# Patient Record
Sex: FEMALE | Race: WHITE | NOT HISPANIC OR LATINO | Employment: OTHER | URBAN - METROPOLITAN AREA
[De-identification: names, ages, dates, MRNs, and addresses within clinical notes are randomized per-mention and may not be internally consistent; named-entity substitution may affect disease eponyms.]

---

## 2021-12-30 ENCOUNTER — HOSPITAL ENCOUNTER (EMERGENCY)
Facility: HOSPITAL | Age: 76
Discharge: HOME | End: 2021-12-30
Attending: EMERGENCY MEDICINE | Admitting: EMERGENCY MEDICINE

## 2021-12-30 ENCOUNTER — OFFICE VISIT (OUTPATIENT)
Dept: CARDIOLOGY | Facility: HOSPITAL | Age: 76
End: 2021-12-30

## 2021-12-30 ENCOUNTER — APPOINTMENT (OUTPATIENT)
Dept: GENERAL RADIOLOGY | Facility: HOSPITAL | Age: 76
End: 2021-12-30

## 2021-12-30 VITALS
WEIGHT: 120 LBS | OXYGEN SATURATION: 97 % | RESPIRATION RATE: 12 BRPM | BODY MASS INDEX: 21.26 KG/M2 | SYSTOLIC BLOOD PRESSURE: 138 MMHG | HEIGHT: 63 IN | DIASTOLIC BLOOD PRESSURE: 54 MMHG | HEART RATE: 47 BPM | TEMPERATURE: 97.5 F

## 2021-12-30 VITALS
DIASTOLIC BLOOD PRESSURE: 67 MMHG | BODY MASS INDEX: 22.5 KG/M2 | RESPIRATION RATE: 15 BRPM | HEIGHT: 63 IN | SYSTOLIC BLOOD PRESSURE: 150 MMHG | TEMPERATURE: 97.6 F | HEART RATE: 48 BPM | OXYGEN SATURATION: 98 % | WEIGHT: 127 LBS

## 2021-12-30 DIAGNOSIS — I10 HYPERTENSION, UNSPECIFIED TYPE: Primary | ICD-10-CM

## 2021-12-30 DIAGNOSIS — R07.9 CHEST PAIN, UNSPECIFIED TYPE: ICD-10-CM

## 2021-12-30 DIAGNOSIS — G47.10 UNCONTROLLED HYPERSOMNIA: Primary | ICD-10-CM

## 2021-12-30 DIAGNOSIS — R00.1 BRADYCARDIA, SINUS: ICD-10-CM

## 2021-12-30 DIAGNOSIS — G47.33 OSA (OBSTRUCTIVE SLEEP APNEA): ICD-10-CM

## 2021-12-30 LAB
ALBUMIN SERPL-MCNC: 4.3 G/DL (ref 3.5–5.2)
ALBUMIN/GLOB SERPL: 1.5 G/DL
ALP SERPL-CCNC: 67 U/L (ref 39–117)
ALT SERPL W P-5'-P-CCNC: 12 U/L (ref 1–33)
ANION GAP SERPL CALCULATED.3IONS-SCNC: 15 MMOL/L (ref 5–15)
AST SERPL-CCNC: 21 U/L (ref 1–32)
BASOPHILS # BLD AUTO: 0.06 10*3/MM3 (ref 0–0.2)
BASOPHILS NFR BLD AUTO: 0.9 % (ref 0–1.5)
BILIRUB SERPL-MCNC: 0.3 MG/DL (ref 0–1.2)
BUN SERPL-MCNC: 23 MG/DL (ref 8–23)
BUN/CREAT SERPL: 22.5 (ref 7–25)
CALCIUM SPEC-SCNC: 10.3 MG/DL (ref 8.6–10.5)
CHLORIDE SERPL-SCNC: 99 MMOL/L (ref 98–107)
CO2 SERPL-SCNC: 23 MMOL/L (ref 22–29)
CREAT SERPL-MCNC: 1.02 MG/DL (ref 0.57–1)
DEPRECATED RDW RBC AUTO: 44.3 FL (ref 37–54)
EOSINOPHIL # BLD AUTO: 0.3 10*3/MM3 (ref 0–0.4)
EOSINOPHIL NFR BLD AUTO: 4.4 % (ref 0.3–6.2)
ERYTHROCYTE [DISTWIDTH] IN BLOOD BY AUTOMATED COUNT: 13.7 % (ref 12.3–15.4)
GFR SERPL CREATININE-BSD FRML MDRD: 53 ML/MIN/1.73
GLOBULIN UR ELPH-MCNC: 2.8 GM/DL
GLUCOSE SERPL-MCNC: 90 MG/DL (ref 65–99)
HCT VFR BLD AUTO: 40.5 % (ref 34–46.6)
HGB BLD-MCNC: 13.7 G/DL (ref 12–15.9)
HOLD SPECIMEN: NORMAL
IMM GRANULOCYTES # BLD AUTO: 0.03 10*3/MM3 (ref 0–0.05)
IMM GRANULOCYTES NFR BLD AUTO: 0.4 % (ref 0–0.5)
LYMPHOCYTES # BLD AUTO: 2.45 10*3/MM3 (ref 0.7–3.1)
LYMPHOCYTES NFR BLD AUTO: 35.9 % (ref 19.6–45.3)
MCH RBC QN AUTO: 29.7 PG (ref 26.6–33)
MCHC RBC AUTO-ENTMCNC: 33.8 G/DL (ref 31.5–35.7)
MCV RBC AUTO: 87.7 FL (ref 79–97)
MONOCYTES # BLD AUTO: 0.72 10*3/MM3 (ref 0.1–0.9)
MONOCYTES NFR BLD AUTO: 10.6 % (ref 5–12)
NEUTROPHILS NFR BLD AUTO: 3.26 10*3/MM3 (ref 1.7–7)
NEUTROPHILS NFR BLD AUTO: 47.8 % (ref 42.7–76)
NRBC BLD AUTO-RTO: 0 /100 WBC (ref 0–0.2)
NT-PROBNP SERPL-MCNC: 176.6 PG/ML (ref 0–1800)
PLATELET # BLD AUTO: 236 10*3/MM3 (ref 140–450)
PMV BLD AUTO: 10.3 FL (ref 6–12)
POTASSIUM SERPL-SCNC: 5 MMOL/L (ref 3.5–5.2)
PROT SERPL-MCNC: 7.1 G/DL (ref 6–8.5)
QT INTERVAL: 462 MS
QTC INTERVAL: 417 MS
RBC # BLD AUTO: 4.62 10*6/MM3 (ref 3.77–5.28)
SODIUM SERPL-SCNC: 137 MMOL/L (ref 136–145)
TROPONIN T SERPL-MCNC: 0.02 NG/ML (ref 0–0.03)
TROPONIN T SERPL-MCNC: <0.01 NG/ML (ref 0–0.03)
TROPONIN T SERPL-MCNC: <0.01 NG/ML (ref 0–0.03)
WBC NRBC COR # BLD: 6.82 10*3/MM3 (ref 3.4–10.8)
WHOLE BLOOD HOLD SPECIMEN: NORMAL
WHOLE BLOOD HOLD SPECIMEN: NORMAL

## 2021-12-30 PROCEDURE — 99283 EMERGENCY DEPT VISIT LOW MDM: CPT

## 2021-12-30 PROCEDURE — 83880 ASSAY OF NATRIURETIC PEPTIDE: CPT | Performed by: EMERGENCY MEDICINE

## 2021-12-30 PROCEDURE — 36415 COLL VENOUS BLD VENIPUNCTURE: CPT

## 2021-12-30 PROCEDURE — 71045 X-RAY EXAM CHEST 1 VIEW: CPT

## 2021-12-30 PROCEDURE — 84484 ASSAY OF TROPONIN QUANT: CPT | Performed by: EMERGENCY MEDICINE

## 2021-12-30 PROCEDURE — 85025 COMPLETE CBC W/AUTO DIFF WBC: CPT | Performed by: EMERGENCY MEDICINE

## 2021-12-30 PROCEDURE — 99204 OFFICE O/P NEW MOD 45 MIN: CPT | Performed by: NURSE PRACTITIONER

## 2021-12-30 PROCEDURE — 93005 ELECTROCARDIOGRAM TRACING: CPT | Performed by: EMERGENCY MEDICINE

## 2021-12-30 PROCEDURE — 80053 COMPREHEN METABOLIC PANEL: CPT | Performed by: EMERGENCY MEDICINE

## 2021-12-30 RX ORDER — ASPIRIN 81 MG/1
81 TABLET ORAL DAILY
COMMUNITY

## 2021-12-30 RX ORDER — CONJUGATED ESTROGENS 0.62 MG/G
0.62 CREAM VAGINAL AS NEEDED
COMMUNITY

## 2021-12-30 RX ORDER — CLONIDINE HYDROCHLORIDE 0.1 MG/1
0.1 TABLET ORAL 2 TIMES DAILY
COMMUNITY

## 2021-12-30 RX ORDER — LEVOTHYROXINE SODIUM 88 UG/1
88 CAPSULE ORAL DAILY
COMMUNITY

## 2021-12-30 RX ORDER — TELMISARTAN 40 MG/1
40 TABLET ORAL DAILY
Qty: 30 TABLET | Refills: 1 | Status: SHIPPED | OUTPATIENT
Start: 2021-12-30

## 2021-12-30 RX ORDER — TELMISARTAN 20 MG/1
20 TABLET ORAL DAILY
COMMUNITY
End: 2021-12-30 | Stop reason: SDUPTHER

## 2021-12-30 RX ORDER — ROSUVASTATIN CALCIUM 20 MG/1
20 TABLET, COATED ORAL 3 TIMES WEEKLY
COMMUNITY

## 2021-12-30 RX ORDER — SODIUM CHLORIDE 0.9 % (FLUSH) 0.9 %
10 SYRINGE (ML) INJECTION AS NEEDED
Status: DISCONTINUED | OUTPATIENT
Start: 2021-12-30 | End: 2021-12-30 | Stop reason: HOSPADM

## 2021-12-30 RX ORDER — ESTROGENS, CONJUGATED 0.45 MG/1
0.45 TABLET, FILM COATED ORAL DAILY
COMMUNITY
Start: 2021-10-10

## 2021-12-30 NOTE — PROGRESS NOTES
"[facility], [facility]    Chief Complaint  Hypertension (f/u Ed visit)    Subjective    History of Present Illness {CC  Problem List  Visit  Diagnosis   Encounters  Notes  Medications  Labs  Result Review Imaging  Media :23}     Nadira Polo presents to [facility] CARDIOLOGY for   History of Present Illness     76-year-old female presented to the ED earlier today with elevated heart rate. Patient had reported mild chest pressure associated with elevated heart rate. Patient has a history of hypertension poorly controlled. She was trying to wean herself off of some of her hypertensive medications. Patient uses clonidine as needed. Patient is from [facility] and has a cardiologist. She is here visiting for a couple weeks with her family.    Patient with history of MICHAEL on CPAP, bradycardia.  Patient reports home blood pressure log typically 140s to 150s.  But can be as high as 200s.    Denies exertional chest pain, pressure, dizziness, near syncope, syncope, fatigue, dyspnea, edema.  Very active.    Patient reports recent cardiac testing.    Objective     Vital Signs:   Vitals:    12/30/21 1613 12/30/21 1616 12/30/21 1617   BP: 165/72 163/67 150/67   BP Location: Right arm Left arm Left arm   Patient Position: Sitting Standing Sitting   Cuff Size: Adult Small Adult Small Adult   Pulse: (!) 44 57 (!) 48   Resp:   15   Temp:   97.6 °F (36.4 °C)   TempSrc:   Temporal   SpO2: 98% 95% 98%   Weight:   57.6 kg (127 lb)   Height:   160 cm (63\")     Body mass index is 22.5 kg/m².  Physical Exam  Vitals reviewed.   Constitutional:       General: She is not in acute distress.     Appearance: Normal appearance.   Cardiovascular:      Rate and Rhythm: Normal rate and regular rhythm.      Pulses:           Radial pulses are 2+ on the right side.        Dorsalis pedis pulses are 2+ on the right side.        Posterior tibial pulses are 2+ on the right " side.      Heart sounds: Normal heart sounds.   Pulmonary:      Effort: Pulmonary effort is normal.      Breath sounds: Normal breath sounds.   Abdominal:      Palpations: Abdomen is soft.      Tenderness: There is no abdominal tenderness.   Musculoskeletal:      Right lower leg: No edema.      Left lower leg: No edema.   Skin:     General: Skin is warm and dry.   Neurological:      Mental Status: She is alert.   Psychiatric:         Mood and Affect: Mood normal.         Behavior: Behavior is cooperative.              Result Review  Data Reviewed:{ Labs  Result Review  Imaging  Med Tab  Media :23}     Admission on 12/30/2021, Discharged on 12/30/2021   Component Date Value Ref Range Status   • QT Interval 12/30/2021 462  ms Final   • QTC Interval 12/30/2021 417  ms Final   • Troponin T 12/30/2021 <0.010  0.000 - 0.030 ng/mL Final   • Extra Tube 12/30/2021 Hold for add-ons.   Final    Auto resulted.   • Extra Tube 12/30/2021 hold for add-on   Final    Auto resulted   • Extra Tube 12/30/2021 Hold for add-ons.   Final    Auto resulted.   • Extra Tube 12/30/2021 Hold for add-ons.   Final    Auto resulted.   • Extra Tube 12/30/2021 hold for add-on   Final    Auto resulted   • Glucose 12/30/2021 90  65 - 99 mg/dL Final   • BUN 12/30/2021 23  8 - 23 mg/dL Final   • Creatinine 12/30/2021 1.02* 0.57 - 1.00 mg/dL Final   • Sodium 12/30/2021 137  136 - 145 mmol/L Final   • Potassium 12/30/2021 5.0  3.5 - 5.2 mmol/L Final    Slight hemolysis detected by analyzer. Results may be affected.   • Chloride 12/30/2021 99  98 - 107 mmol/L Final   • CO2 12/30/2021 23.0  22.0 - 29.0 mmol/L Final   • Calcium 12/30/2021 10.3  8.6 - 10.5 mg/dL Final   • Total Protein 12/30/2021 7.1  6.0 - 8.5 g/dL Final   • Albumin 12/30/2021 4.30  3.50 - 5.20 g/dL Final   • ALT (SGPT) 12/30/2021 12  1 - 33 U/L Final   • AST (SGOT) 12/30/2021 21  1 - 32 U/L Final   • Alkaline Phosphatase 12/30/2021 67  39 - 117 U/L Final   • Total Bilirubin 12/30/2021  0.3  0.0 - 1.2 mg/dL Final   • eGFR Non African Amer 12/30/2021 53* >60 mL/min/1.73 Final   • Globulin 12/30/2021 2.8  gm/dL Final    Calculated Result   • A/G Ratio 12/30/2021 1.5  g/dL Final   • BUN/Creatinine Ratio 12/30/2021 22.5  7.0 - 25.0 Final   • Anion Gap 12/30/2021 15.0  5.0 - 15.0 mmol/L Final   • proBNP 12/30/2021 176.6  0.0-1,800.0 pg/mL Final   • WBC 12/30/2021 6.82  3.40 - 10.80 10*3/mm3 Final   • RBC 12/30/2021 4.62  3.77 - 5.28 10*6/mm3 Final   • Hemoglobin 12/30/2021 13.7  12.0 - 15.9 g/dL Final   • Hematocrit 12/30/2021 40.5  34.0 - 46.6 % Final   • MCV 12/30/2021 87.7  79.0 - 97.0 fL Final   • MCH 12/30/2021 29.7  26.6 - 33.0 pg Final   • MCHC 12/30/2021 33.8  31.5 - 35.7 g/dL Final   • RDW 12/30/2021 13.7  12.3 - 15.4 % Final   • RDW-SD 12/30/2021 44.3  37.0 - 54.0 fl Final   • MPV 12/30/2021 10.3  6.0 - 12.0 fL Final   • Platelets 12/30/2021 236  140 - 450 10*3/mm3 Final   • Neutrophil % 12/30/2021 47.8  42.7 - 76.0 % Final   • Lymphocyte % 12/30/2021 35.9  19.6 - 45.3 % Final   • Monocyte % 12/30/2021 10.6  5.0 - 12.0 % Final   • Eosinophil % 12/30/2021 4.4  0.3 - 6.2 % Final   • Basophil % 12/30/2021 0.9  0.0 - 1.5 % Final   • Immature Grans % 12/30/2021 0.4  0.0 - 0.5 % Final   • Neutrophils, Absolute 12/30/2021 3.26  1.70 - 7.00 10*3/mm3 Final   • Lymphocytes, Absolute 12/30/2021 2.45  0.70 - 3.10 10*3/mm3 Final   • Monocytes, Absolute 12/30/2021 0.72  0.10 - 0.90 10*3/mm3 Final   • Eosinophils, Absolute 12/30/2021 0.30  0.00 - 0.40 10*3/mm3 Final   • Basophils, Absolute 12/30/2021 0.06  0.00 - 0.20 10*3/mm3 Final   • Immature Grans, Absolute 12/30/2021 0.03  0.00 - 0.05 10*3/mm3 Final   • nRBC 12/30/2021 0.0  0.0 - 0.2 /100 WBC Final   • Troponin T 12/30/2021 <0.010  0.000 - 0.030 ng/mL Final   • Troponin T 12/30/2021 0.018  0.000 - 0.030 ng/mL Final       Assessment and Plan {CC Problem List  Visit Diagnosis  ROS  Review (Popup)  Health Maintenance  Quality  BestPractice   Medications  SmartSets  SnapShot Encounters  Media :23}   1. Essential hypertension  Patient will increase telmisartan to 40 mg daily.  Have BMP in 1 week.  Follow-up with primary cardiologist in [facility] for continued management.  Encouraged home blood pressure log.    Educated patient on proper blood pressure monitoring, blood pressure goals of less than 140/80 without symptoms of hypotension.  Discussed signs and symptoms associated with hypertension to be concerned.    Patient will continue clonidine at this point twice a day.  If blood pressure is better controlled on higher doses, telmisartan may consider using clonidine on a as needed basis but encouraged to discuss with her her cardiologist first    - Basic Metabolic Panel; Future    2.  Bradycardia  Asymptomatic, continue to monitor.  Reviewed signs and symptoms associated with bradycardia    Liliana  Encouraged cpap compliance.        Follow Up {Instructions Charge Capture  Follow-up Communications :23}   Return if symptoms worsen or fail to improve.    Patient was given instructions and counseling regarding her condition or for health maintenance advice. Please see specific information pulled into the AVS if appropriate.  Patient was instructed to call the [facility] with any questions, concerns, or worsening symptoms.

## 2022-01-05 ENCOUNTER — LAB (OUTPATIENT)
Dept: LAB | Facility: HOSPITAL | Age: 77
End: 2022-01-05

## 2022-01-05 DIAGNOSIS — G47.10 UNCONTROLLED HYPERSOMNIA: ICD-10-CM

## 2022-01-05 LAB
ANION GAP SERPL CALCULATED.3IONS-SCNC: 11.6 MMOL/L (ref 5–15)
BUN SERPL-MCNC: 30 MG/DL (ref 8–23)
BUN/CREAT SERPL: 31.9 (ref 7–25)
CALCIUM SPEC-SCNC: 9.5 MG/DL (ref 8.6–10.5)
CHLORIDE SERPL-SCNC: 100 MMOL/L (ref 98–107)
CO2 SERPL-SCNC: 24.4 MMOL/L (ref 22–29)
CREAT SERPL-MCNC: 0.94 MG/DL (ref 0.57–1)
GFR SERPL CREATININE-BSD FRML MDRD: 58 ML/MIN/1.73
GLUCOSE SERPL-MCNC: 75 MG/DL (ref 65–99)
POTASSIUM SERPL-SCNC: 5 MMOL/L (ref 3.5–5.2)
SODIUM SERPL-SCNC: 136 MMOL/L (ref 136–145)

## 2022-01-05 PROCEDURE — 36415 COLL VENOUS BLD VENIPUNCTURE: CPT

## 2022-01-05 PROCEDURE — 80048 BASIC METABOLIC PNL TOTAL CA: CPT

## 2022-01-06 ENCOUNTER — TELEPHONE (OUTPATIENT)
Dept: CARDIOLOGY | Facility: HOSPITAL | Age: 77
End: 2022-01-06

## 2022-01-06 NOTE — TELEPHONE ENCOUNTER
Called patient and reviewed labs results.  Reports doing well with BP meds dosing change.  NO concerns.  She will f/u with her PCP and Primary cardiologist in [place].